# Patient Record
Sex: FEMALE | URBAN - METROPOLITAN AREA
[De-identification: names, ages, dates, MRNs, and addresses within clinical notes are randomized per-mention and may not be internally consistent; named-entity substitution may affect disease eponyms.]

---

## 2021-03-22 ENCOUNTER — APPOINTMENT (RX ONLY)
Dept: URBAN - METROPOLITAN AREA CLINIC 173 | Facility: CLINIC | Age: 61
Setting detail: DERMATOLOGY
End: 2021-03-22

## 2021-03-22 DIAGNOSIS — Z41.9 ENCOUNTER FOR PROCEDURE FOR PURPOSES OTHER THAN REMEDYING HEALTH STATE, UNSPECIFIED: ICD-10-CM

## 2021-03-22 PROCEDURE — ? COSMETIC CONSULTATION: FILLERS

## 2021-03-22 PROCEDURE — ? COSMETIC CONSULTATION: BOTULINUM TOXIN

## 2021-03-22 PROCEDURE — ? COSMETIC CONSULTATION: FRACTIONAL RESURFACING

## 2021-03-22 PROCEDURE — ? BOTOX

## 2021-03-22 PROCEDURE — ? TREATMENT REGIMEN

## 2021-03-22 NOTE — PROCEDURE: BOTOX
Show Additional Area 1: Yes
Additional Area 3 Units: 0
Show Right And Left Brow Units: No
Glabellar Complex Units: 21
Post-Care Instructions: Patient instructed to not lie down for 4 hours and limit physical activity for 24 hours. Patient instructed not to travel by airplane for 48 hours.
Detail Level: Detailed
Expiration Date (Month Year): 10/23
Lot #: [id number]
Price (Use Numbers Only, No Special Characters Or $): 249
Inferior Lateral Orbicularis Oculi Units: 24
Dilution (U/0.1 Cc): 5
Forehead Units: 4
Consent: Written consent obtained. Risks include but not limited to lid/brow ptosis, bruising, swelling, diplopia, temporary effect, incomplete chemical denervation.

## 2021-10-12 ENCOUNTER — APPOINTMENT (RX ONLY)
Dept: URBAN - METROPOLITAN AREA CLINIC 173 | Facility: CLINIC | Age: 61
Setting detail: DERMATOLOGY
End: 2021-10-12

## 2021-10-12 DIAGNOSIS — Z41.9 ENCOUNTER FOR PROCEDURE FOR PURPOSES OTHER THAN REMEDYING HEALTH STATE, UNSPECIFIED: ICD-10-CM

## 2021-10-12 PROCEDURE — ? BOTOX

## 2021-10-12 PROCEDURE — ? FILLERS

## 2021-10-12 PROCEDURE — ? PRESCRIPTION

## 2021-10-12 RX ORDER — VALACYCLOVIR HYDROCHLORIDE 500 MG/1
TABLET, FILM COATED ORAL
Qty: 30 | Refills: 3 | Status: ERX | COMMUNITY
Start: 2021-10-12

## 2021-10-12 RX ADMIN — VALACYCLOVIR HYDROCHLORIDE: 500 TABLET, FILM COATED ORAL at 00:00

## 2021-10-12 NOTE — PROCEDURE: FILLERS
Mid Face Filler  Volume In Cc: 0
Detail Level: Detailed
Expiration Date (Month Year): 01/31/22
Include Cannula Information In Note?: No
Price (Use Numbers Only, No Special Characters Or $): 7608
Additional Area 1 Location: lower face
Include Cannula Size?: 27G
Additional Area 1 Volume In Cc: 1
Additional Area 1 Location: lips
Include Cannula Length?: 1.5 inch
Map Statment: See Attach Map for Complete Details
Additional Area 1 Location: Ear Lobes
Filler: Restylane Defyne
Filler Comments: *[facility] special*
Filler: RHA 2
Anesthesia Type: 1% lidocaine with epinephrine 1:100,000 buffered with 8.4% sodium bicarbonate (1:9 ratio)
Consent: Written consent obtained. Risks include but not limited to bruising, beading, irregular texture, ulceration, infection, allergic reaction, scar formation, incomplete augmentation, temporary nature, procedural pain.
Post-Care Instructions: Patient instructed to apply ice to reduce swelling. Patient was given Cetirizine 10mg tablet in office for swelling.
Anesthesia Volume In Cc: 0.5
Lot #: [id number]
Lot #: [id number]
Expiration Date (Month Year): 1/31/2023
Additional Anesthesia Volume In Cc: 6
Include Cannula Information In Note?: Yes
Expiration Date (Month Year): 3/5/2024
Topical Anesthesia?: 30% lidocaine, plasticized base
Lot #: [id number]

## 2021-10-12 NOTE — PROCEDURE: BOTOX
Inferior Lateral Orbicularis Oculi Units: 0
Show Mentalis Units: No
Show Periorbital Units: Yes
Detail Level: Detailed
Periorbital Skin Units: 24
Price (Use Numbers Only, No Special Characters Or $): 895
Consent: Written consent obtained. Risks include but not limited to lid/brow ptosis, bruising, swelling, diplopia, temporary effect, incomplete chemical denervation.
Lot #: [id number]
Dilution (U/0.1 Cc): 5
Forehead Units: 4
Post-Care Instructions: Patient instructed to not lie down for 4 hours and limit physical activity for 24 hours. Patient instructed not to travel by airplane for 48 hours.
Glabellar Complex Units: 21
Expiration Date (Month Year): 1/2024

## 2022-03-07 ENCOUNTER — APPOINTMENT (RX ONLY)
Dept: URBAN - METROPOLITAN AREA CLINIC 173 | Facility: CLINIC | Age: 62
Setting detail: DERMATOLOGY
End: 2022-03-07

## 2022-03-07 DIAGNOSIS — Z41.9 ENCOUNTER FOR PROCEDURE FOR PURPOSES OTHER THAN REMEDYING HEALTH STATE, UNSPECIFIED: ICD-10-CM

## 2022-03-07 PROCEDURE — ? BOTOX

## 2022-03-07 NOTE — PROCEDURE: BOTOX
Show Additional Area 1: Yes
Additional Area 3 Units: 0
Show Right And Left Brow Units: No
Glabellar Complex Units: 21
Post-Care Instructions: Patient instructed to not lie down for 4 hours and limit physical activity for 24 hours. Patient instructed not to travel by airplane for 48 hours.
Detail Level: Detailed
Expiration Date (Month Year): 05/2024
Lot #: [id number]
Price (Use Numbers Only, No Special Characters Or $): 602
Inferior Lateral Orbicularis Oculi Units: 24
Dilution (U/0.1 Cc): 5
Forehead Units: 4
Consent: Written consent obtained. Risks include but not limited to lid/brow ptosis, bruising, swelling, diplopia, temporary effect, incomplete chemical denervation.

## 2022-11-14 ENCOUNTER — APPOINTMENT (RX ONLY)
Dept: URBAN - METROPOLITAN AREA CLINIC 173 | Facility: CLINIC | Age: 62
Setting detail: DERMATOLOGY
End: 2022-11-14

## 2022-11-14 DIAGNOSIS — Z41.9 ENCOUNTER FOR PROCEDURE FOR PURPOSES OTHER THAN REMEDYING HEALTH STATE, UNSPECIFIED: ICD-10-CM

## 2022-11-14 DIAGNOSIS — B00.1 HERPESVIRAL VESICULAR DERMATITIS: ICD-10-CM

## 2022-11-14 PROCEDURE — ? BOTOX

## 2022-11-14 PROCEDURE — ? COUNSELING

## 2022-11-14 PROCEDURE — ? PRESCRIPTION

## 2022-11-14 PROCEDURE — ? TREATMENT REGIMEN

## 2022-11-14 PROCEDURE — ? FILLERS

## 2022-11-14 RX ORDER — VALACYCLOVIR 500 MG/1
TABLET, FILM COATED ORAL
Qty: 60 | Refills: 1 | Status: ERX | COMMUNITY
Start: 2022-11-14

## 2022-11-14 RX ADMIN — VALACYCLOVIR: 500 TABLET, FILM COATED ORAL at 00:00

## 2022-11-14 ASSESSMENT — LOCATION ZONE DERM: LOCATION ZONE: FACE

## 2022-11-14 ASSESSMENT — LOCATION DETAILED DESCRIPTION DERM: LOCATION DETAILED: LEFT CENTRAL BUCCAL CHEEK

## 2022-11-14 ASSESSMENT — LOCATION SIMPLE DESCRIPTION DERM: LOCATION SIMPLE: LEFT CHEEK

## 2022-11-14 NOTE — PROCEDURE: TREATMENT REGIMEN
Initiate Treatment: valacyclovir 500 mg tablet Take one pill twice daily at onset x for 5 days as needed for flares.
Detail Level: Zone

## 2022-11-14 NOTE — PROCEDURE: BOTOX
Show Additional Area 1: Yes
Additional Area 3 Units: 0
Show Right And Left Brow Units: No
Glabellar Complex Units: 21
Post-Care Instructions: Patient instructed to not lie down for 4 hours and limit physical activity for 24 hours. Patient instructed not to travel by airplane for 48 hours.
Additional Area 1 Location: [facility]
Detail Level: Detailed
Expiration Date (Month Year): 11/2024
Additional Area 1 Units: 5
Lot #: [id number]
Price (Use Numbers Only, No Special Characters Or $): 889
Additional Area 2 Location: neck
Inferior Lateral Orbicularis Oculi Units: 24
Forehead Units: 4
Consent: Written consent obtained. Risks include but not limited to lid/brow ptosis, bruising, swelling, diplopia, temporary effect, incomplete chemical denervation.

## 2022-11-14 NOTE — PROCEDURE: FILLERS
Additional Area 2 Volume In Cc: 0
Additional Area 1 Location: lower face
Additional Anesthesia Volume In Cc: 6
Additional Area 1 Volume In Cc: 1
Expiration Date (Month Year): 01/31/22
Detail Level: Detailed
Include Cannula Length?: 1.5 inch
Topical Anesthesia?: 30% lidocaine, plasticized base
Price (Use Numbers Only, No Special Characters Or $): 3168
Include Cannula Information In Note?: No
Consent: Written consent obtained. Risks include but not limited to bruising, beading, irregular texture, ulceration, infection, allergic reaction, scar formation, incomplete augmentation, temporary nature, procedural pain.
Additional Area 1 Location: Ear Lobes
Filler: Luis Navarro
Filler: RHA 1
Post-Care Instructions: Patient instructed to apply Alastin post injection serum
Map Statment: See Attach Map for Complete Details
Lot #: [id number]
Expiration Date (Month Year): 3/31/2025
Aspiration Statement: Aspiration was performed prior to injecting site with filler.
Lot #: [id number]
Anesthesia Type: 1% lidocaine with epinephrine
Expiration Date (Month Year): 4/23/2025
Include Cannula Information In Note?: Yes
Include Cannula Size?: 27G
Lot #: [id number]

## 2022-11-29 ENCOUNTER — RX ONLY (OUTPATIENT)
Age: 62
Setting detail: RX ONLY
End: 2022-11-29

## 2022-11-29 RX ORDER — VALACYCLOVIR 500 MG/1
TABLET, FILM COATED ORAL
Qty: 30 | Refills: 1 | Status: ERX

## 2023-04-10 ENCOUNTER — APPOINTMENT (RX ONLY)
Dept: URBAN - METROPOLITAN AREA CLINIC 173 | Facility: CLINIC | Age: 63
Setting detail: DERMATOLOGY
End: 2023-04-10

## 2023-04-10 DIAGNOSIS — Z41.9 ENCOUNTER FOR PROCEDURE FOR PURPOSES OTHER THAN REMEDYING HEALTH STATE, UNSPECIFIED: ICD-10-CM

## 2023-04-10 PROCEDURE — ? BOTOX

## 2023-04-10 PROCEDURE — ? PATIENT SPECIFIC COUNSELING

## 2023-04-10 NOTE — PROCEDURE: BOTOX
Show Additional Area 1: Yes
Additional Area 3 Units: 0
Show Right And Left Brow Units: No
Glabellar Complex Units: 28
Post-Care Instructions: Patient instructed to not lie down for 4 hours and limit physical activity for 24 hours. Patient instructed not to travel by airplane for 48 hours.
Detail Level: Detailed
Expiration Date (Month Year): 09/2025
Lot #: [id number]
Price (Use Numbers Only, No Special Characters Or $): 469
Periorbital Skin Units: 24
Dilution (U/0.1 Cc): 5
Forehead Units: 4
Consent: Written consent obtained. Risks include but not limited to lid/brow ptosis, bruising, swelling, diplopia, temporary effect, incomplete chemical denervation.

## 2023-10-30 ENCOUNTER — APPOINTMENT (RX ONLY)
Dept: URBAN - METROPOLITAN AREA CLINIC 173 | Facility: CLINIC | Age: 63
Setting detail: DERMATOLOGY
End: 2023-10-30

## 2023-10-30 DIAGNOSIS — Z41.9 ENCOUNTER FOR PROCEDURE FOR PURPOSES OTHER THAN REMEDYING HEALTH STATE, UNSPECIFIED: ICD-10-CM

## 2023-10-30 PROCEDURE — ? FILLERS

## 2023-10-30 PROCEDURE — ? BOTOX

## 2023-10-30 PROCEDURE — ? PRESCRIPTION

## 2023-10-30 PROCEDURE — ? COSMETIC CONSULTATION: LASER RESURFACING

## 2023-10-30 RX ORDER — VALACYCLOVIR HYDROCHLORIDE 500 MG/1
TABLET, FILM COATED ORAL
Qty: 30 | Refills: 6 | Status: ERX

## 2023-10-30 NOTE — PROCEDURE: FILLERS
Jawline Filler Volume In Cc: 0
Expiration Date (Month Year): 01/31/22
Lot #: [id number]
Additional Area 1 Location: mouth corners, lower lip, NLF
Include Cannula Information In Note?: No
Additional Area 1 Volume In Cc: 1
Include Cannula Length?: 1.5 inch
Expiration Date (Month Year): 11/22/2025
Consent: Written consent obtained. Risks include but not limited to bruising, beading, irregular texture, ulceration, infection, allergic reaction, scar formation, incomplete augmentation, temporary nature, procedural pain.
Anesthesia Type: 2% lidocaine with epinephrine and a 1:10 solution of 8.4% sodium bicarbonate
Include Cannula Information In Note?: Yes
Post-Care Instructions: Patient instructed to apply Alastin post injection serum
Additional Area 1 Location: Ear Lobes
Additional Area 1 Location: temples, lateral face
Include Cannula Size?: 25G
Additional Anesthesia Volume In Cc: 6
Lot #: [id number]
Topical Anesthesia?: 23% lidocaine, 7% tetracaine
Detail Level: Detailed
Expiration Date (Month Year): 01/09/2026
Price (Use Numbers Only, No Special Characters Or $): 9357
Filler: RHA 3
Map Statment: See Attach Map for Complete Details
Include Cannula Size?: 27G
Filler: RHA 2
Aspiration Statement: Aspiration was performed prior to injecting site with filler.
Lot #: [id number]

## 2023-10-30 NOTE — PROCEDURE: BOTOX
Show Additional Area 1: Yes
Additional Area 3 Units: 0
Show Right And Left Brow Units: No
Glabellar Complex Units: 28
Post-Care Instructions: Patient instructed to not lie down for 4 hours and limit physical activity for 24 hours. Patient instructed not to travel by airplane for 48 hours.
Detail Level: Detailed
Expiration Date (Month Year): 04/2026
Lot #: [id number]
Price (Use Numbers Only, No Special Characters Or $): 017
Periorbital Skin Units: 24
Dilution (U/0.1 Cc): 5
Consent: Written consent obtained. Risks include but not limited to lid/brow ptosis, bruising, swelling, diplopia, temporary effect, incomplete chemical denervation.

## 2024-04-29 ENCOUNTER — RX ONLY (OUTPATIENT)
Age: 64
Setting detail: RX ONLY
End: 2024-04-29

## 2024-04-29 ENCOUNTER — APPOINTMENT (RX ONLY)
Dept: URBAN - METROPOLITAN AREA CLINIC 173 | Facility: CLINIC | Age: 64
Setting detail: DERMATOLOGY
End: 2024-04-29

## 2024-04-29 DIAGNOSIS — Z41.9 ENCOUNTER FOR PROCEDURE FOR PURPOSES OTHER THAN REMEDYING HEALTH STATE, UNSPECIFIED: ICD-10-CM

## 2024-04-29 PROCEDURE — ? TREATMENT REGIMEN

## 2024-04-29 PROCEDURE — ? COSMETIC CONSULTATION: LASER RESURFACING

## 2024-04-29 PROCEDURE — ? BOTOX

## 2024-04-29 RX ORDER — VALACYCLOVIR HYDROCHLORIDE 500 MG/1
TABLET, FILM COATED ORAL
Qty: 30 | Refills: 6 | Status: ERX

## 2024-04-29 NOTE — PROCEDURE: BOTOX
Show Additional Area 1: Yes
Additional Area 3 Units: 0
Show Right And Left Brow Units: No
Glabellar Complex Units: 28
Post-Care Instructions: Patient instructed to not lie down for 4 hours and limit physical activity for 24 hours. Patient instructed not to travel by airplane for 48 hours.
Detail Level: Detailed
Expiration Date (Month Year): 05/2026
Lot #: [id number]
Price (Use Numbers Only, No Special Characters Or $): 532
Periorbital Skin Units: 24
Dilution (U/0.1 Cc): 5
Consent: Written consent obtained. Risks include but not limited to lid/brow ptosis, bruising, swelling, diplopia, temporary effect, incomplete chemical denervation.

## 2024-10-21 ENCOUNTER — APPOINTMENT (RX ONLY)
Dept: URBAN - METROPOLITAN AREA CLINIC 173 | Facility: CLINIC | Age: 64
Setting detail: DERMATOLOGY
End: 2024-10-21

## 2024-10-21 DIAGNOSIS — Z41.9 ENCOUNTER FOR PROCEDURE FOR PURPOSES OTHER THAN REMEDYING HEALTH STATE, UNSPECIFIED: ICD-10-CM

## 2024-10-21 PROCEDURE — ? BOTOX

## 2024-10-21 PROCEDURE — ? FILLERS

## 2024-10-21 NOTE — PROCEDURE: FILLERS
Include Cannula Length?: 1.5 inch
Additional Area 2 Volume In Cc: 0
Anesthesia Type: 2% lidocaine with epinephrine and a 1:10 solution of sodium bicarbonate and clindamycin
Filler: RHA 4
Include Cannula Information In Note?: No
Anesthesia Volume In Cc: 0.5
Additional Area 1 Location: mouth corners and lower lip border
Lot #: [id number]
Topical Anesthesia?: 30% lidocaine, plasticized base
Filler: RHA Redensity
Expiration Date (Month Year): 1/19/2027
Detail Level: Detailed
Lot #: [id number]
Include Cannula Information In Note?: Yes
Price (Use Numbers Only, No Special Characters Or $): 7362
Consent: Written consent obtained. Risks include but not limited to bruising, beading, irregular texture, ulceration, infection, allergic reaction, scar formation, incomplete augmentation, temporary nature, procedural pain.
Expiration Date (Month Year): 9/15/2025
Post-Care Instructions: Patient instructed to apply Alastin post injection serum
Filler: RHA 2
Additional Area 1 Location: temples
Map Statment: See Attach Map for Complete Details
Additional Area 1 Volume In Cc: 1
Lot #: [id number]
Expiration Date (Month Year): 6/12/2026
Aspiration Statement: Aspiration was performed prior to injecting site with filler.
Include Cannula Size?: 27G
Additional Area 1 Location: jawline

## 2024-10-21 NOTE — PROCEDURE: BOTOX
Depressor Anguli Oris Units: 8
Show Lateral Platysmal Band Units: Yes
Inferior Lateral Orbicularis Oculi Units: 0
Expiration Date (Month Year): 03/2027
Price (Use Numbers Only, No Special Characters Or $): 740
Additional Area 2 Location: [facility]
Periorbital Skin Units: 24
Consent: Written consent obtained. Risks include but not limited to lid/brow ptosis, bruising, swelling, diplopia, temporary effect, incomplete chemical denervation.
Show Ucl Units: No
Additional Area 2 Units: 3
Post-Care Instructions: Patient instructed to not lie down for 4 hours and limit physical activity for 24 hours. Patient instructed not to travel by airplane for 48 hours.
Incrementing Botox Units: By 0.5 Units
Lot #: [id number]
Additional Area 1 Location: lips
Dilution (U/0.1 Cc): 5
Additional Area 1 Units: 6
Glabellar Complex Units: 28
Detail Level: Detailed

## 2025-01-02 ENCOUNTER — APPOINTMENT (OUTPATIENT)
Dept: URBAN - METROPOLITAN AREA CLINIC 173 | Facility: CLINIC | Age: 65
Setting detail: DERMATOLOGY
End: 2025-01-02

## 2025-01-02 DIAGNOSIS — Z41.9 ENCOUNTER FOR PROCEDURE FOR PURPOSES OTHER THAN REMEDYING HEALTH STATE, UNSPECIFIED: ICD-10-CM

## 2025-01-02 PROCEDURE — ? BOTOX
